# Patient Record
Sex: MALE | Race: OTHER | NOT HISPANIC OR LATINO
[De-identification: names, ages, dates, MRNs, and addresses within clinical notes are randomized per-mention and may not be internally consistent; named-entity substitution may affect disease eponyms.]

---

## 2018-02-26 ENCOUNTER — TRANSCRIPTION ENCOUNTER (OUTPATIENT)
Age: 23
End: 2018-02-26

## 2018-03-13 ENCOUNTER — TRANSCRIPTION ENCOUNTER (OUTPATIENT)
Age: 23
End: 2018-03-13

## 2018-05-07 ENCOUNTER — TRANSCRIPTION ENCOUNTER (OUTPATIENT)
Age: 23
End: 2018-05-07

## 2018-05-29 ENCOUNTER — TRANSCRIPTION ENCOUNTER (OUTPATIENT)
Age: 23
End: 2018-05-29

## 2020-09-03 ENCOUNTER — EMERGENCY (EMERGENCY)
Facility: HOSPITAL | Age: 25
LOS: 1 days | Discharge: HOME | End: 2020-09-03
Attending: STUDENT IN AN ORGANIZED HEALTH CARE EDUCATION/TRAINING PROGRAM | Admitting: STUDENT IN AN ORGANIZED HEALTH CARE EDUCATION/TRAINING PROGRAM
Payer: COMMERCIAL

## 2020-09-03 VITALS
SYSTOLIC BLOOD PRESSURE: 125 MMHG | OXYGEN SATURATION: 98 % | RESPIRATION RATE: 14 BRPM | DIASTOLIC BLOOD PRESSURE: 83 MMHG | TEMPERATURE: 97 F | HEART RATE: 64 BPM

## 2020-09-03 VITALS
TEMPERATURE: 98 F | OXYGEN SATURATION: 97 % | HEART RATE: 71 BPM | DIASTOLIC BLOOD PRESSURE: 79 MMHG | RESPIRATION RATE: 16 BRPM | SYSTOLIC BLOOD PRESSURE: 132 MMHG

## 2020-09-03 DIAGNOSIS — Z98.890 OTHER SPECIFIED POSTPROCEDURAL STATES: Chronic | ICD-10-CM

## 2020-09-03 PROCEDURE — 72100 X-RAY EXAM L-S SPINE 2/3 VWS: CPT | Mod: 26

## 2020-09-03 PROCEDURE — 70450 CT HEAD/BRAIN W/O DYE: CPT

## 2020-09-03 PROCEDURE — 73110 X-RAY EXAM OF WRIST: CPT

## 2020-09-03 PROCEDURE — 73030 X-RAY EXAM OF SHOULDER: CPT | Mod: 26,LT

## 2020-09-03 PROCEDURE — 99284 EMERGENCY DEPT VISIT MOD MDM: CPT | Mod: 25

## 2020-09-03 PROCEDURE — 70486 CT MAXILLOFACIAL W/O DYE: CPT | Mod: 26

## 2020-09-03 PROCEDURE — 70450 CT HEAD/BRAIN W/O DYE: CPT | Mod: 26

## 2020-09-03 PROCEDURE — 99285 EMERGENCY DEPT VISIT HI MDM: CPT

## 2020-09-03 PROCEDURE — 73562 X-RAY EXAM OF KNEE 3: CPT | Mod: 26,RT

## 2020-09-03 PROCEDURE — 73030 X-RAY EXAM OF SHOULDER: CPT

## 2020-09-03 PROCEDURE — 73110 X-RAY EXAM OF WRIST: CPT | Mod: 26,LT

## 2020-09-03 PROCEDURE — 72125 CT NECK SPINE W/O DYE: CPT | Mod: 26

## 2020-09-03 PROCEDURE — 72125 CT NECK SPINE W/O DYE: CPT

## 2020-09-03 PROCEDURE — 70486 CT MAXILLOFACIAL W/O DYE: CPT

## 2020-09-03 PROCEDURE — 73562 X-RAY EXAM OF KNEE 3: CPT

## 2020-09-03 PROCEDURE — 72100 X-RAY EXAM L-S SPINE 2/3 VWS: CPT

## 2020-09-03 RX ORDER — ACETAMINOPHEN 500 MG
975 TABLET ORAL ONCE
Refills: 0 | Status: COMPLETED | OUTPATIENT
Start: 2020-09-03 | End: 2020-09-03

## 2020-09-03 RX ADMIN — Medication 975 MILLIGRAM(S): at 18:40

## 2020-09-03 NOTE — ED PROVIDER NOTE - NS ED ROS FT
Constitutional: No fever.  Neurological: + headache.  Eyes: No vision changes.    Ears, Nose, Mouth, Throat: No congestion.  Cardiovascular: No chest pain.  Respiratory: No difficulty breathing.  Gastrointestinal: + nausea. No vomiting.  Genitourinary: No dysuria.  Musculoskeletal: + joint pain.  Integumentary (skin and/or breast):+ abrasion.

## 2020-09-03 NOTE — ED PROVIDER NOTE - PROGRESS NOTE DETAILS
imaging discussed with pt- no acute fracture but patient still with left snuff box tenderness. splint applied. will give ortho follow up.

## 2020-09-03 NOTE — ED PROVIDER NOTE - CARE PROVIDER_API CALL
Guanako Ordonez  ORTHOPAEDIC SURGERY  [address] [place] [address]  Phone: [phone]  Fax: [phone]  Follow Up Time: 4-6 Days
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
## 2020-09-03 NOTE — ED PROVIDER NOTE - PATIENT PORTAL LINK FT
You can access the FollowMyHealth Patient Portal offered by [facility] by registering at the following website: [email or web address]. By joining [email or web address] FollowMyHealth portal, you will also be able to view your health information using other applications (apps) compatible with our system.
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
## 2020-09-03 NOTE — ED ADULT TRIAGE NOTE - NS ED NURSE BANDS TYPE
Patient Name: Grace JOANN Caballero  Specialist or PCP: NARDA Victor MD  Symptoms: She said that she has been changing a super+ tampon every 20-30 min.  She also said that her period was supposed to have come in late-March.  Best Availability:    Callback Number: [phone]    Thank you,  Grace Jones     Name band;

## 2020-09-03 NOTE — ED ADULT NURSE REASSESSMENT NOTE - NS ED NURSE REASSESS COMMENT FT1
Received patient from Jose CASTANEDA, Patient resting comfortably, updated about CT results. Awaiting disposition. Safety maintained.
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
## 2020-09-03 NOTE — ED PROVIDER NOTE - NSFOLLOWUPINSTRUCTIONS_ED_ALL_ED_FT
ED evaluation and management discussed with the patient and family (if available) in detail.  Close PMD follow up encouraged.  Strict ED return instructions discussed in detail and patient given the opportunity to ask any questions about their discharge diagnosis and instructions. Patient verbalized understanding.     Fracture    A fracture is a break in one of your bones. This can occur from a variety of injuries, especially traumatic ones. Symptoms include pain, bruising, or swelling. Do not use the injured limb. If a fracture is in one of the bones below your waist, do not put weight on that limb unless instructed to do so by your healthcare provider. Crutches or a cane may have been provided. A splint or cast may have been applied by your health care provider. Make sure to keep it dry and follow up with an orthopedist as instructed.    SEEK IMMEDIATE MEDICAL CARE IF YOU HAVE ANY OF THE FOLLOWING SYMPTOMS: numbness, tingling, increasing pain, or weakness in any part of the injured limb.

## 2020-09-13 ENCOUNTER — APPOINTMENT (OUTPATIENT)
Dept: DISASTER EMERGENCY | Facility: CLINIC | Age: 25
End: 2020-09-13

## 2020-09-13 DIAGNOSIS — Z01.818 ENCOUNTER FOR OTHER PREPROCEDURAL EXAMINATION: ICD-10-CM

## 2020-09-14 ENCOUNTER — OUTPATIENT (OUTPATIENT)
Dept: OUTPATIENT SERVICES | Facility: HOSPITAL | Age: 25
LOS: 1 days | End: 2020-09-14
Payer: COMMERCIAL

## 2020-09-14 VITALS
RESPIRATION RATE: 16 BRPM | HEIGHT: 69 IN | HEART RATE: 77 BPM | WEIGHT: 160.06 LBS | SYSTOLIC BLOOD PRESSURE: 102 MMHG | DIASTOLIC BLOOD PRESSURE: 70 MMHG | TEMPERATURE: 98 F | OXYGEN SATURATION: 100 %

## 2020-09-14 DIAGNOSIS — Z01.818 ENCOUNTER FOR OTHER PREPROCEDURAL EXAMINATION: ICD-10-CM

## 2020-09-14 DIAGNOSIS — Z98.890 OTHER SPECIFIED POSTPROCEDURAL STATES: Chronic | ICD-10-CM

## 2020-09-14 DIAGNOSIS — S62.522A DISPLACED FRACTURE OF DISTAL PHALANX OF LEFT THUMB, INITIAL ENCOUNTER FOR CLOSED FRACTURE: ICD-10-CM

## 2020-09-14 LAB — SARS-COV-2 N GENE NPH QL NAA+PROBE: NOT DETECTED

## 2020-09-14 PROCEDURE — 80048 BASIC METABOLIC PNL TOTAL CA: CPT

## 2020-09-14 PROCEDURE — G0463: CPT

## 2020-09-14 PROCEDURE — 36415 COLL VENOUS BLD VENIPUNCTURE: CPT

## 2020-09-14 PROCEDURE — 85027 COMPLETE CBC AUTOMATED: CPT

## 2020-09-14 NOTE — H&P PST ADULT - HISTORY OF PRESENT ILLNESS
26 yo male with no significant PMHx, reports the above. He is scheduled for : Open Reduction Internal Fixation of Left Thumb Distal Phalanx, on 9/16/20

## 2020-09-14 NOTE — H&P PST ADULT - NSICDXPROBLEM_GEN_ALL_CORE_FT
PROBLEM DIAGNOSES  Problem: Displaced fracture of distal phalanx of left thumb, initial encounter for closed fracture  Assessment and Plan: Open Reduction Internal Fixation of Left Thumb Distal Phalanx

## 2020-09-14 NOTE — H&P PST ADULT - NS PRO ABUSE SCREEN SUSPICION NEGLECT YN
Data: Nga Mckeon transferred to room 221 via wheelchair at 1720  . Baby transferred via parent's arms.  Action: Receiving unit notified of transfer: Yes. Patient and family notified of room change. Report given to Charleen EDDY  at 1720 Belongings sent to receiving unit. Accompanied by Registered Nurse. Oriented patient to surroundings. Call light within reach. ID bands double-checked with receiving RN.  Response: Patient tolerated transfer and is stable. Was able to void without difficulty.    no

## 2020-09-14 NOTE — H&P PST ADULT - ASSESSMENT
26 yo male is scheduled for : Open Reduction Internal Fixation of Left Thumb Distal Phalanx, on 9/16/20

## 2020-09-15 ENCOUNTER — TRANSCRIPTION ENCOUNTER (OUTPATIENT)
Age: 25
End: 2020-09-15

## 2020-09-16 ENCOUNTER — OUTPATIENT (OUTPATIENT)
Dept: OUTPATIENT SERVICES | Facility: HOSPITAL | Age: 25
LOS: 1 days | End: 2020-09-16
Payer: COMMERCIAL

## 2020-09-16 VITALS
RESPIRATION RATE: 14 BRPM | DIASTOLIC BLOOD PRESSURE: 69 MMHG | OXYGEN SATURATION: 100 % | TEMPERATURE: 98 F | SYSTOLIC BLOOD PRESSURE: 109 MMHG | HEART RATE: 73 BPM

## 2020-09-16 VITALS
SYSTOLIC BLOOD PRESSURE: 130 MMHG | HEIGHT: 69 IN | WEIGHT: 160.06 LBS | OXYGEN SATURATION: 98 % | TEMPERATURE: 98 F | RESPIRATION RATE: 16 BRPM | HEART RATE: 85 BPM | DIASTOLIC BLOOD PRESSURE: 73 MMHG

## 2020-09-16 DIAGNOSIS — S62.522A DISPLACED FRACTURE OF DISTAL PHALANX OF LEFT THUMB, INITIAL ENCOUNTER FOR CLOSED FRACTURE: ICD-10-CM

## 2020-09-16 DIAGNOSIS — Z98.890 OTHER SPECIFIED POSTPROCEDURAL STATES: Chronic | ICD-10-CM

## 2020-09-16 PROCEDURE — C1769: CPT

## 2020-09-16 PROCEDURE — 26605 TREAT METACARPAL FRACTURE: CPT | Mod: F4

## 2020-09-16 PROCEDURE — 25635 CLTX CARPL FX W/MNPJ EA B1: CPT | Mod: LT

## 2020-09-16 PROCEDURE — 26756 PIN FINGER FRACTURE EACH: CPT | Mod: FA

## 2020-09-16 RX ORDER — OXYCODONE HYDROCHLORIDE 5 MG/1
1 TABLET ORAL
Qty: 0 | Refills: 0 | DISCHARGE

## 2020-09-16 RX ORDER — OXYCODONE AND ACETAMINOPHEN 5; 325 MG/1; MG/1
1 TABLET ORAL EVERY 4 HOURS
Refills: 0 | Status: DISCONTINUED | OUTPATIENT
Start: 2020-09-16 | End: 2020-09-16

## 2020-09-16 RX ORDER — SODIUM CHLORIDE 9 MG/ML
1000 INJECTION, SOLUTION INTRAVENOUS
Refills: 0 | Status: DISCONTINUED | OUTPATIENT
Start: 2020-09-16 | End: 2020-09-23

## 2020-09-16 RX ORDER — SODIUM CHLORIDE 9 MG/ML
3 INJECTION INTRAMUSCULAR; INTRAVENOUS; SUBCUTANEOUS EVERY 8 HOURS
Refills: 0 | Status: DISCONTINUED | OUTPATIENT
Start: 2020-09-16 | End: 2020-09-16

## 2020-09-16 RX ORDER — OXYCODONE HYDROCHLORIDE 5 MG/1
2 TABLET ORAL
Qty: 20 | Refills: 0
Start: 2020-09-16

## 2020-09-16 RX ORDER — ONDANSETRON 8 MG/1
4 TABLET, FILM COATED ORAL EVERY 6 HOURS
Refills: 0 | Status: DISCONTINUED | OUTPATIENT
Start: 2020-09-16 | End: 2020-09-23

## 2020-09-16 RX ORDER — CELECOXIB 200 MG/1
200 CAPSULE ORAL ONCE
Refills: 0 | Status: COMPLETED | OUTPATIENT
Start: 2020-09-16 | End: 2020-09-16

## 2020-09-16 RX ORDER — FENTANYL CITRATE 50 UG/ML
25 INJECTION INTRAVENOUS
Refills: 0 | Status: DISCONTINUED | OUTPATIENT
Start: 2020-09-16 | End: 2020-09-16

## 2020-09-16 RX ORDER — FENTANYL CITRATE 50 UG/ML
50 INJECTION INTRAVENOUS
Refills: 0 | Status: DISCONTINUED | OUTPATIENT
Start: 2020-09-16 | End: 2020-09-16

## 2020-09-16 RX ORDER — ACETAMINOPHEN 500 MG
975 TABLET ORAL ONCE
Refills: 0 | Status: COMPLETED | OUTPATIENT
Start: 2020-09-16 | End: 2020-09-16

## 2020-09-16 RX ORDER — OXYCODONE AND ACETAMINOPHEN 5; 325 MG/1; MG/1
2 TABLET ORAL EVERY 6 HOURS
Refills: 0 | Status: DISCONTINUED | OUTPATIENT
Start: 2020-09-16 | End: 2020-09-16

## 2020-09-16 RX ADMIN — FENTANYL CITRATE 50 MICROGRAM(S): 50 INJECTION INTRAVENOUS at 10:20

## 2020-09-16 RX ADMIN — Medication 975 MILLIGRAM(S): at 06:36

## 2020-09-16 RX ADMIN — FENTANYL CITRATE 50 MICROGRAM(S): 50 INJECTION INTRAVENOUS at 09:58

## 2020-09-16 RX ADMIN — OXYCODONE AND ACETAMINOPHEN 1 TABLET(S): 5; 325 TABLET ORAL at 11:14

## 2020-09-16 RX ADMIN — OXYCODONE AND ACETAMINOPHEN 1 TABLET(S): 5; 325 TABLET ORAL at 10:44

## 2020-09-16 RX ADMIN — CELECOXIB 200 MILLIGRAM(S): 200 CAPSULE ORAL at 06:36

## 2020-09-16 RX ADMIN — SODIUM CHLORIDE 3 MILLILITER(S): 9 INJECTION INTRAMUSCULAR; INTRAVENOUS; SUBCUTANEOUS at 06:32

## 2020-09-16 NOTE — BRIEF OPERATIVE NOTE - NSICDXBRIEFPROCEDURE_GEN_ALL_CORE_FT
PROCEDURES:  Open reduction and internal fixation (ORIF) of fracture of phalanx of left thumb 16-Sep-2020 09:22:44  Nia Shafer

## 2020-09-16 NOTE — ASU DISCHARGE PLAN (ADULT/PEDIATRIC) - RETURN TO WORK/SCHOOL
POD # 1  s/p  section. Stable.  Encourage ambulation  po pain meds  Regular diet as tolerated
- Continue regular diet.  - Increase ambulation.  - Continue motrin, tylenol, oxycodone PRN for pain contro    SHIRA Cortes PGY3
- Continue regular diet.  - Increase ambulation.  - Continue motrin, tylenol, oxycodone PRN for pain control
No...

## 2020-09-16 NOTE — ASU DISCHARGE PLAN (ADULT/PEDIATRIC) - CARE PROVIDER_API CALL
Иван Chatman  ORTHOPAEDIC SURGERY  [address] [place] [address] [address]  [place] [address]  Phone: [phone]  Fax: [phone]  Follow Up Time: 2 weeks

## 2020-09-16 NOTE — ASU DISCHARGE PLAN (ADULT/PEDIATRIC) - ACTIVITY LEVEL
No weight bearing/Elevate extremity/Arm sling, Vincent pillow No heavy lifting/No weight bearing/Elevate extremity/Arm sling, Vincent pillow

## 2020-10-05 NOTE — ASU PATIENT PROFILE, ADULT - NS PRO PASSIVE SMOKE EXP
No
Discharge instructions given by Dr. Stark. Prolonged NST reactive. Patient verbalized discharge instructions. Patient stable to be discharged home.

## 2021-08-11 NOTE — ED PROCEDURE NOTE - NS ED PROC PERFORMED BY1 FT
Me Detail Level: Detailed Depth Of Biopsy: dermis Was A Bandage Applied: Yes Size Of Lesion In Cm: 0 Biopsy Type: H and E Biopsy Method: 15 blade Anesthesia Type: 1% lidocaine with epinephrine Anesthesia Volume In Cc: 1 Hemostasis: Drysol Wound Care: Vaseline Dressing: Band-Aid Destruction After The Procedure: No Type Of Destruction Used: Electrodesiccation Electrodesiccation And Curettage Text: The wound bed was treated with electrodesiccation and curettage after the biopsy was performed. Silver Nitrate Text: The wound bed was treated with silver nitrate after the biopsy was performed. Lab: [id number] Consent: Verbal consent was obtained and risks were reviewed including but not limited to scarring, infection, bleeding, scabbing, and incomplete removal. Post-Care Instructions: Post care instructions were reviewed. Notification Instructions: Patient will be notified of biopsy results within two weeks. Billing Type: Third-Party Bill Information: Selecting Yes will display possible errors in your note based on the variables you have selected. This validation is only offered as a suggestion for you. PLEASE NOTE THAT THE VALIDATION TEXT WILL BE REMOVED WHEN YOU FINALIZE YOUR NOTE. IF YOU WANT TO FAX A PRELIMINARY NOTE YOU WILL NEED TO TOGGLE THIS TO 'NO' IF YOU DO NOT WANT IT IN YOUR FAXED NOTE.

## 2025-01-21 NOTE — ASU DISCHARGE PLAN (ADULT/PEDIATRIC) - ACCOMPANIED BY
Detail Level: Zone Render In Strict Bullet Format?: No Initiate Treatment: triamcinolone acetonide 0.1 % topical cream BID\\nQuantity: 30.0 g  Days Supply: 30\\nSig: Apply to affected area of the hands bid for two weeks Family

## 2025-03-29 NOTE — ED ADULT TRIAGE NOTE - ESI TRIAGE ACUITY LEVEL, MLM
Group Topic: BH Therapeutic Activity    Date: 3/29/2025  Start Time: 1500  End Time: 1545  Facilitators: Sarah Powell    Focus: Art/Activity  Number in attendance: 5/12  Pt was recruited for group but did not attend. Efforts to encourage participation in programming on the unit will continue.  Sarah Powell           4

## 2025-04-25 NOTE — ED ADULT NURSE NOTE - NS ED NURSE LEVEL OF CONSCIOUSNESS SPEECH
CC: Depressed mood with cycle      HPI: Pt is a 12 y.o.  female who presents to discuss her depressed mood related to her cycle. She has autism. She is here today with her mom. Reports menarche at age 11.  The mental symptoms have worsened over the past several months. She reports associated pain and feels lethargic at this time. She is currently on Zoloft 200 mg daily and uses Trazadone for sleep.She denies any SI/ HI. No prior psychiatric hospitalizations. She see's a counselor regularly- has good rapport with counselor and they have been working together since she was 5 yr. She is in home school. She does not currently exercise regularly. Reports symptoms of isolation, irritability, fatigue, less interest in activities, and mood swings. Reports regular monthly cycles.  Denies history of Denies VTE, tobacco use, hypertension, or migraines w aura.         ROS:  GENERAL: Feeling well overall. Denies fever or chills.   SKIN: Denies rash or lesions.   HEAD: Denies head injury or headache.   NODES: Denies enlarged lymph nodes.   CHEST: Denies chest pain or shortness of breath.   CARDIOVASCULAR: Denies palpitations or left sided chest pain.   ABDOMEN: No abdominal pain, constipation, diarrhea, nausea, vomiting or rectal bleeding.   URINARY: No dysuria, hematuria, or burning on urination.  REPRODUCTIVE: See HPI.   BREASTS: Denies pain, lumps, or nipple discharge.   HEMATOLOGIC: No easy bruisability or excessive bleeding.   MUSCULOSKELETAL: Denies joint pain or swelling.   NEUROLOGIC: Denies syncope or weakness.   PSYCHIATRIC: + depression, anxiety or mood swings.     PE:   APPEARANCE: Well nourished, well developed, female in no acute distress.  PELVIS: Deferred     Diagnosis:  1. PMDD (premenstrual dysphoric disorder)        Plan:     Orders Placed This Encounter    POCT Urine Pregnancy    L norgest/e.estradioL-e.estrad (SEASONIQUE) 0.15 mg-30 mcg (84)/10 mcg (7) 3MPk          Plan:     Discussed symptoms are consistent  with PMDD  Discussed option of continuous dosing OCPs for PMDD management- will trial Seasonique- discussed Sunday start   Discussed various timing of SSRI dosing for PMDD  Discussed tapping technique (alternative acupressure therapy treatment) to help manage symptoms   Discussed importance of tracking her symptoms   Discussed importance of reaching out and not isolating when symptomatic   Discussed if she resumes talk therapy to schedule sessions when premenstrual and more likely to be symptomatic    Discussed lifestyle modifications for PMDD importance of regular exercise, good nutrition, positive support system  Discussed if suicidal or homicidal ideation occurs to reach out for help/ go to there ER.  Pt verbalized understanding.     Follow-up with me in 6 months or JARETH Cooley          Answers submitted by the patient for this visit:  Gynecologic Exam Questionnaire  (Submitted on 4/24/2025)  Chief Complaint: Gynecologic exam  genital itching: No  genital lesions: No  genital odor: Yes  genital rash: No  missed menses: No  pelvic pain: No  vaginal bleeding: No  vaginal discharge: No  Chronicity: chronic  Onset: more than 1 month ago  Frequency: intermittently  Progression since onset: unchanged  Pain severity: severe  Affected side: both  Pregnant now?: No  abdominal pain: No  anorexia: No  back pain: Yes  chills: No  constipation: No  diarrhea: No  discolored urine: No  dysuria: No  fever: No  frequency: No  headaches: No  hematuria: No  nausea: Yes  painful intercourse: No  rash: No  urgency: No  vomiting: No  Please select the characteristics of your discharge: : thick, white  Vaginal bleeding: typical of menses  Passing clots?: No  Passing tissue?: No  Aggravated by: activity  treatments tried: acetaminophen, heating pad, NSAIDs  Improvement on treatment: mild  Sexual activity: not sexually active  Partner with STD symptoms: no  Menstrual history: regular  STD: No  abdominal  surgery: No   section: No  Ectopic pregnancy: No  Endometriosis: No  herpes simplex: No  gynecological surgery: No  menorrhagia: No  metrorrhagia: No  miscarriage: No  ovarian cysts: No  perineal abscess: No  PID: No  terminated pregnancy: No  vaginosis: No     Speaking Coherently